# Patient Record
Sex: FEMALE | Race: WHITE | NOT HISPANIC OR LATINO | Employment: FULL TIME | URBAN - METROPOLITAN AREA
[De-identification: names, ages, dates, MRNs, and addresses within clinical notes are randomized per-mention and may not be internally consistent; named-entity substitution may affect disease eponyms.]

---

## 2024-03-01 DIAGNOSIS — Z91.89 AT HIGH RISK FOR BREAST CANCER: Primary | ICD-10-CM

## 2024-03-01 DIAGNOSIS — Z80.3 FAMILY HISTORY OF BREAST CANCER: ICD-10-CM

## 2024-03-21 NOTE — PROGRESS NOTES
[facility] - [facility]  Breast Surgical Oncology  New Patient Office Visit - H&P      Referring Provider: Dr. Rivas Padilla  PCP: No, Primary Doctor   Care Team:  OBGYN: No data on file.    Chief Complaint:   Chief Complaint   Patient presents with    Genetic Evaluation     Patient reports no breast related concerns       Subjective:     HPI:  Shelby Bello is a 43 y.o. female who presents on 3/25/2024 for evaluation of risk for breast cancer. Based on the Tyrer-Cuzick Breast Cancer Risk Model, her lifetime risk is calculated to be 27.5%.    Patient is doing well today. She currently denies any breast issues including rashes, redness, pain, swelling, nipple discharge, or new lumps/masses. She recently had a screening mammogram done in 2024 which required further imaging. She had a right diagnostic mammogram in 2024 which resulted as benign.  Patient states she recently underwent genetic testing with her gynecologist through [facility], but she has not gotten the results yet.  She has regular menstrual cycles.  She performs her self-breast exams regularly.  Patient has been using testosterone pellets for about 2 years now. Patient states she lives a relatively healthy lifestyle.  She states she does Pilates a couple times a week for exercise.  She does not smoke and she drinks alcohol on occasion.  She currently works for a construction company.    Of note, patient's mother was diagnosed with breast cancer around the age of 65.  Her father was diagnosed with skin cancer around the age of 65.  She has a paternal uncle who had prostate cancer at the age of 70.  She has a maternal aunt who was diagnosed with multiple myeloma at the age of 55.      Imagin2024 SC MG at [facility] - additional evaluation recommended for questions right breast asymmetry.  BI-RADS 0 needs additional imaging.  3/6/2024 R DG MG at [facility]- Previously described right lateral breast asymmetry  does not persist,   compatible with superimposition of fibroglandular tissue. No mammographic   evidence of malignancy. BI-RADS 2-benign findings     Pathology:   none    OB/GYN History:  Age at Menarche Onset: 12  Menopausal Status: premenopausal, LMP: Patient's last menstrual period was 03/03/2024.  Hysterectomy/Oophorectomy: NA  Hormonal birth control (duration): 22 years  Pregnancy History: Nulliparous   Age at first live birth: NA  Hormone Replacement Therapy: No, none    Other:  MG breast density:BIRADS C   Prior thoracic RT: none  Genetic testing:  Patient underwent genetic testing with her gynecologist through [facility].  She states she is still waiting on the results.    Ashkenazi Hoahaoism descent: No    Family History:  Family History   Problem Relation Age of Onset    Breast cancer Mother 60 - 69    Skin cancer Father 60 - 69    Alzheimer's disease Maternal Grandmother     Lung cancer Maternal Grandfather 60 - 69    Heart disease Paternal Grandmother     Heart disease Paternal Grandfather     Prostate cancer Paternal Uncle         unknown    Bone cancer Maternal Aunt 50 - 59        Patient History:  Past Medical History:   Diagnosis Date    ADD (attention deficit disorder)     Allergy     Anxiety     Thyroid disease        Past Surgical History:   Procedure Laterality Date    EYE SURGERY      TONSILLECTOMY         Social History     Socioeconomic History    Marital status:    Tobacco Use    Smoking status: Never    Smokeless tobacco: Never   Substance and Sexual Activity    Alcohol use: Never    Drug use: Never    Sexual activity: Never         There is no immunization history on file for this patient.    Medications/Allergies:    Current Outpatient Medications:     ARMOUR THYROID 60 mg Tab, Take 60 mg by mouth., Disp: , Rfl:     cetirizine (ZYRTEC) 10 MG tablet, Take 10 mg by mouth., Disp: , Rfl:     lisdexamfetamine (VYVANSE) 30 MG capsule, Take 30 mg by mouth., Disp: , Rfl:     MAGNESIUM  "GLYCINATE-MAG OXIDE ORAL, Take by mouth., Disp: , Rfl:     melatonin 1 mg Chew, Take by mouth., Disp: , Rfl:     multivitamin with minerals (HAIR,SKIN AND NAILS) tablet, Take 1 tablet by mouth once daily., Disp: , Rfl:     tretinoin (RETIN-A) 0.05 % cream, Apply topically., Disp: , Rfl:      Review of patient's allergies indicates:   Allergen Reactions    Codeine Itching       Review of Systems:  All pertinent history in HPI.     Objective:     Vitals:  Vitals:    03/25/24 1326   BP: 125/83   BP Location: Left arm   Patient Position: Sitting   BP Method: Medium (Automatic)   Pulse: 89   Resp: 18   Temp: 99.1 °F (37.3 °C)   TempSrc: Oral   SpO2: 98%   Weight: 54.9 kg (121 lb)   Height: 4' 11" (1.499 m)       Body mass index is 24.44 kg/m².     Physical Exam:  General: The patient is awake, alert and oriented times three. The patient is well nourished and in no acute distress.  Neck: There is no evidence of palpable cervical, supraclavicular or axillary adenopathy. The neck is supple. The thyroid is not enlarged.  Musculoskeletal: The patient has a normal range of motion of her bilateral upper extremities.  Chest: Examination of the chest wall fails to reveal any obvious abnormalities.  The lungs are clear to auscultation bilaterally without rales, rhonchi, or wheezing.  Cardiovascular: The heart has a regular rate and rhythm without murmurs, gallops or rubs.  Breast:   Right:  Examination of right breast fails to reveal any dominant masses or areas of significant focal nodularity. The nipple is everted without evidence of discharge. There is no skin dimpling with movement of the pectoralis. There is no significant skin changes overlying the breast.   Left:  Examination of the left breast fails to reveal any dominant masses or areas of significant focal nodularity. The nipple is everted without evidence of discharge. There is no skin dimpling with movement of the pectoralis. There are no significant skin changes " overlying the breast.  Abdomen: The abdomen is soft, flat, nontender and nondistended with no palpable masses or organomegaly.  Integumentary: no rashes or skin lesions present  Neurologic: cranial nerves intact, no signs of peripheral neurological deficit, motor/sensory function intact    Assessment:     There is no problem list on file for this patient.       Shelby was seen today for genetic evaluation.    Diagnoses and all orders for this visit:    At high risk for breast cancer  -     Ambulatory referral/consult to Breast Surgery  -     Mammo Digital Screening Bilat w/ Donny; Future  -     MRI Screening Breast W/WO Contrast, W/CAD, Alejandro; Future    Family history of breast cancer  -     Ambulatory referral/consult to Breast Surgery  -     Mammo Digital Screening Bilat w/ Donny; Future  -     MRI Screening Breast W/WO Contrast, W/CAD, Alejandro; Future    Screening mammogram for breast cancer  -     Mammo Digital Screening Bilat w/ Donny; Future       --------------------------------------------------------------------------------------------------------------  After the initial clinical evaluation nearly 30 minutes were on counseling the patients regarding the options for management. Risk reduction strategies were discussed.     1. Lifestyle factors: As with other types of cancer, studies continue to show that various lifestyle factors may contribute to the development of breast cancer.     Weight: Recent studies have shown that postmenopausal women who are overweight or obese have an increased risk of breast cancer. These women also have a higher risk of having the cancer come back after treatment.     Physical activity: Decreased physical activity is associated with an increased risk of developing breast cancer and a higher risk of having the cancer come back after treatment. Regular physical activity may protect against breast cancer by helping women maintain a healthy body weight, lowering hormone levels, or causing  "changes in a womens metabolism or immune factors.     Alcohol: Current research suggests that having more than 1 to 2 alcoholic drinks, including beer, wine, and spirits, per day raises the risk of breast cancer, as well as the risk of having the cancer come back after treatment.     Food: There is no reliable research that confirms that eating or avoiding specific foods reduces the risk of developing breast cancer or having the cancer come back after treatment. However, eating more fruits and vegetables and fewer animal fats is linked with many health benefits.     2. Prevention:  Surgery to lower cancer risk: For women with BRCA1 or BRCA2 genetic mutations, which substantially increase the risk of breast cancer, preventive removal of the breasts may be considered. The procedure, called a prophylactic mastectomy, appears to reduce the risk of developing breast cancer by at least 95%. Women with these mutations should also consider the preventive removal of the ovaries and fallopian tubes, called a prophylactic salpingo-oophorectomy. This procedure can reduce the risk of developing ovarian cancer, as well as breast cancer, by stopping the ovaries from making estrogen.      Drugs to lower cancer risk (Chemoprevention): Women who have a higher than usual risk of developing breast cancer may consider certain drugs that may help prevent breast cancer. This approach may also be called "chemoprevention." For breast cancer, this is the use of hormone-blocking drugs to reduce cancer risk. The drugs, tamoxifen (Soltamox) and raloxifene (Evista), are approved by the U.S. Food and Drug Administration (FDA) to lower breast cancer risk. These drugs are called selective estrogen receptor modulators (SERMs) and are not chemotherapy. A SERM is a medication that blocks estrogen receptors in some tissues and not others. Both women who have gone through menopause and those who have not may take tamoxifen. Raloxifene is only approved " for women who have gone through menopause. Each drug also has different side effects.     Aromatase inhibitors (AIs) have also been shown to lower breast cancer risk. AIs are a type of hormone-blocking treatment that reduces the amount of estrogen in a woman's body by stopping tissues and organs other than the ovaries from producing estrogen. They can only be used by women who have gone through menopause. However, no AIs have been approved by the FDA for lowering breast cancer risk in women who do not have the disease.     3. Surveillance: Women with a known genetic mutation should follow screening guidelines per the NCCN guidelines. Women with no known genetic mutation  and found to be at greater than 20 percent average lifetime risk of breast cancer are recommended for the following screening recommendations:     Clinical Breast exam: Every 6-12 months starting at age found to be at increased risk by risk model     Mammogram: Per NCCN guidelines, recommended every year starting 10 years younger than the youngest breast cancer case in the family (but not before age 30). May consider beginning breast MRIs at age 30 per ACR guidelines if desired by patient or other clinical considerations. May also consider getting a baseline MG at time of initial high risk consultation, if not already obtained.     Breast MRI: Per NCCN guidelines, recommended every year starting 10 years younger than the youngest breast cancer case in the family (but not before age 25). May consider beginning breast MRIs at age 30 per ACR guidelines if desired by patient or other clinical considerations. If patient has a first-degree relative with a BRCA1/2 gene mutation, youre encouraged to get genetic counseling and/or testing before getting MRI as part of screening (for those who do not wish to have genetic testing, MRI is recommended). Breast MRI in combination with mammography is better than mammography alone at finding breast cancer in  "certain women at higher than average risk.     --------------------------------------------------------------------------------------------------------------     Hormone Replacement Therapy (HRT) - Per breastcancer.org:    Combination HRT increases breast cancer risk by about 75%, even when used for only a short time. Combination HRT also increases the likelihood that the cancer may be found at a more advanced stage, as well as increasing the risk that a woman diagnosed with breast cancer will die from the disease.    Estrogen-only HRT increases the risk of breast cancer, but only when used for more than 10 years. Estrogen-only HRT also can increase the risk of ovarian cancer.    The higher breast cancer risk from using HRT is the same for so-called "bioidentical" and "natural" hormones as it is for synthetic hormones. "Bioidentical" means the hormones in the product are identical to the hormones your body produces. Bioidentical hormones are said to be "natural" -- derived from plants. Synthetic hormones are made in a lab and are also chemically identical to the hormones in your body. It's important to know that many herbal and bioidentical HRT products fall outside the jurisdiction of the [place] Food and Drug Administration and so aren't subject to the same regulations and testing that medications are.    In addition, HRT is known to increased mammographic density, which may obscure small masses.        Plan:       1. Lifestyle - Healthy lifestyle guidelines were reviewed. She was encouraged to engage in regular exercise, maintain a healthy body weight, and avoid excessive alcohol consumption. Healthy nutritional guidelines were also discussed. Self-breast examination was reviewed with the patient in detail and she was encouraged to perform this on a monthly basis.Discussed with patient the risks of being on hormone replacement therapy and provided her with an educational handout.      2. Surveillance - She " desires undergoing high risk screening with annual screening mammograms and breast MRIs. In the absence of significant clinical findings in the interval, I recommend breast MRI in August 2024 and screening mammogram in February 2025.  RTC in 6 months.    3. Prevention - We had a brief discussion/education about indications for preventative mastectomy or chemoprevention.  These methods are not recommended to her at this time.    4. Genetics - Patient has already undergone genetic testing and is currently waiting for the results.    5. Other routine screening exams:   -  Recommend annual follow up with PCP.   -  Recommend annual follow up with GYN for pap smears/gynecologic exams and CBE.       All of her questions were answered. She was advised to call if she develops any questions or concerns.    Martha Foster PA-C     --------------------------------------------------------------------------------------------------------------  Total time on the date of the visit ranged from 60-74 mins ([id number]). Total time includes both face-to-face and non-face-to-face time personally spent by myself on the day of the visit.    Non-face-to-face time included:  _X_ preparing to see the patient such as reviewing the patient record  _X_ obtaining and reviewing separately obtained history  _X_ independently interpreting results  _X_ documenting clinical information in electronic health record.    Face-to-face time included:  _X_ performing an appropriate history and examination  _X_ communicating results to the patient  _X_ counseling and educating the patient  __ ordering appropriate medications  _x_ ordering appropriate tests  _X_ ordering appropriate procedures (including follow-up)  _X_ answering any questions the patient had    Total Time spent on date of visit: 60 minutes

## 2024-03-25 ENCOUNTER — OFFICE VISIT (OUTPATIENT)
Dept: SURGERY | Facility: CLINIC | Age: 44
End: 2024-03-25
Payer: COMMERCIAL

## 2024-03-25 VITALS
SYSTOLIC BLOOD PRESSURE: 125 MMHG | HEIGHT: 59 IN | BODY MASS INDEX: 24.39 KG/M2 | WEIGHT: 121 LBS | DIASTOLIC BLOOD PRESSURE: 83 MMHG | OXYGEN SATURATION: 98 % | RESPIRATION RATE: 18 BRPM | HEART RATE: 89 BPM | TEMPERATURE: 99 F

## 2024-03-25 DIAGNOSIS — Z80.3 FAMILY HISTORY OF BREAST CANCER: ICD-10-CM

## 2024-03-25 DIAGNOSIS — Z12.31 SCREENING MAMMOGRAM FOR BREAST CANCER: ICD-10-CM

## 2024-03-25 DIAGNOSIS — Z91.89 AT HIGH RISK FOR BREAST CANCER: Primary | ICD-10-CM

## 2024-03-25 PROCEDURE — 3079F DIAST BP 80-89 MM HG: CPT | Mod: CPTII,S$GLB,,

## 2024-03-25 PROCEDURE — 1160F RVW MEDS BY RX/DR IN RCRD: CPT | Mod: CPTII,S$GLB,,

## 2024-03-25 PROCEDURE — 3074F SYST BP LT 130 MM HG: CPT | Mod: CPTII,S$GLB,,

## 2024-03-25 PROCEDURE — 99999 PR PBB SHADOW E&M-EST. PATIENT-LVL V: CPT | Mod: PBBFAC,,,

## 2024-03-25 PROCEDURE — 3008F BODY MASS INDEX DOCD: CPT | Mod: CPTII,S$GLB,,

## 2024-03-25 PROCEDURE — 99205 OFFICE O/P NEW HI 60 MIN: CPT | Mod: S$GLB,,,

## 2024-03-25 PROCEDURE — 1159F MED LIST DOCD IN RCRD: CPT | Mod: CPTII,S$GLB,,

## 2024-03-25 RX ORDER — LISDEXAMFETAMINE DIMESYLATE 30 MG/1
30 CAPSULE ORAL
COMMUNITY
Start: 2024-03-04

## 2024-03-25 RX ORDER — MELATONIN 1 MG
TABLET,CHEWABLE ORAL
COMMUNITY

## 2024-03-25 RX ORDER — TRETINOIN 0.5 MG/G
CREAM TOPICAL
COMMUNITY
Start: 2023-12-21

## 2024-03-25 RX ORDER — CETIRIZINE HYDROCHLORIDE 10 MG/1
10 TABLET ORAL
COMMUNITY

## 2024-03-25 RX ORDER — SULFAMETHOXAZOLE AND TRIMETHOPRIM 800; 160 MG/1; MG/1
60 TABLET ORAL
COMMUNITY
Start: 2024-03-24

## 2024-10-07 NOTE — PROGRESS NOTES
[facility] Breast Surg  Breast Surgical Oncology  Est Patient Office Visit - H&P    Referring Provider: No ref. provider found  PCP: No, Primary Doctor   Care Team:  OBGYN: No data on file.    Chief Complaint:   High-risk follow-up appointment     Subjective:     Interval: 10/09/2024 Patient here for six-month high-risk follow-up appointment. Patient is doing well today.  She has no breast concerns today.  She would not undergo any imaging in the interval.  Patient states she was never scheduled for her breast MRI, but she would like to be rescheduled today.  She states she did have a maternal uncle in his 60s who was diagnosed with stomach cancer in the interval. She denies any other significant interval changes.  Patient is still currently using testosterone pellets.     HPI:  Shelby Bello is a 44 y.o. female who presents on 3/25/2024 for evaluation of risk for breast cancer. Based on the Tyrer-Cuzick Breast Cancer Risk Model, her lifetime risk is calculated to be 27.5%.    Patient is doing well today. She currently denies any breast issues including rashes, redness, pain, swelling, nipple discharge, or new lumps/masses. She recently had a screening mammogram done in February of 2024 which required further imaging. She had a right diagnostic mammogram in March of 2024 which resulted as benign.  Patient states she recently underwent genetic testing with her gynecologist through [facility], but she has not gotten the results yet.  She has regular menstrual cycles.  She performs her self-breast exams regularly.  Patient has been using testosterone pellets for about 2 years now. Patient states she lives a relatively healthy lifestyle.  She states she does Pilates a couple times a week for exercise. She does not smoke and she drinks alcohol on occasion.  She currently works for a construction company.    Of note, patient's mother was diagnosed with breast cancer around the age of 65.  Her  father was diagnosed with skin cancer around the age of 65.  She has a paternal uncle who had prostate cancer at the age of 70. She has a maternal aunt who was diagnosed with multiple myeloma at the age of 55.      Imagin2024 SC MG at [facility] - additional evaluation recommended for questions right breast asymmetry.  BI-RADS 0 needs additional imaging.  3/6/2024 R DG MG at [facility]- Previously described right lateral breast asymmetry does not persist,   compatible with superimposition of fibroglandular tissue. No mammographic   evidence of malignancy. BI-RADS 2-benign findings     Pathology:   none    OB/GYN History:  Age at Menarche Onset: 12  Menopausal Status: premenopausal, LMP: Patient's last menstrual period was 10/01/2024 (exact date).  Hysterectomy/Oophorectomy: NA  Hormonal birth control (duration): 22 years  Pregnancy History: Nulliparous   Age at first live birth: Negative   Hormone Replacement Therapy: Yes. Testosterone pellets.     Other:  MG breast density:BIRADS C   Prior thoracic RT: none  Genetic testing:  Negative.  Ashkenazi Hindu descent: No    Family History:  Family History   Problem Relation Name Age of Onset    Breast cancer Mother Rena Vasquez 60 - 69    Hypertension Mother Rena Vasquez     Depression Mother Rena Vasquez     Skin cancer Father Taj Spearseajuan 60 - 69    Heart disease Father Taj Spearseajuan     Alzheimer's disease Maternal Grandmother      Lung cancer Maternal Grandfather Yusuf Adamesouard 60 - 69    Cancer Maternal Grandfather Yusuf Vuong     Heart disease Paternal Grandmother Marsha Haddadhreaux     Heart disease Paternal Grandfather Maritza Gothreaux     Prostate cancer Paternal Uncle          unknown    Bone cancer Maternal Aunt  50 - 59    Cancer Maternal Aunt Saige Hernándezrk     Cancer Maternal Uncle Tim Hastingsard     Alcohol abuse Maternal Uncle Tim Hastingsard     Drug abuse Maternal Uncle Tim Vuong     Cancer Maternal Uncle DOUGIE Vuong      Cancer Paternal Uncle Vimal Vasquez     Heart disease Paternal Uncle Vimal Vasquez     Hypertension Sister Dayanara Lentz     Thyroid disease Sister Dayanara Lentz     Alcohol abuse Brother Jeff Vasquez     Depression Brother Jeff Vasquez     Drug abuse Brother Jeff Spearseaux     Heart disease Paternal Aunt Oralia Haile         Patient History:  Past Medical History:   Diagnosis Date    ADD (attention deficit disorder)     Allergy     Anxiety     Thyroid disease        Past Surgical History:   Procedure Laterality Date    EYE SURGERY      TONSILLECTOMY         Social History     Socioeconomic History    Marital status:    Tobacco Use    Smoking status: Never    Smokeless tobacco: Never   Substance and Sexual Activity    Alcohol use: Yes     Alcohol/week: 2.0 standard drinks of alcohol     Types: 1 Glasses of wine, 1 Drinks containing 0.5 oz of alcohol per week     Comment: Not every week. 1 or the other    Drug use: Never    Sexual activity: Yes     Partners: Male     Birth control/protection: None         There is no immunization history on file for this patient.    Medications/Allergies:    Current Outpatient Medications:     ARMOUR THYROID 60 mg Tab, Take 60 mg by mouth., Disp: , Rfl:     cetirizine (ZYRTEC) 10 MG tablet, Take 10 mg by mouth., Disp: , Rfl:     clonazePAM (KLONOPIN) 0.5 MG tablet, Take 0.5 mg by mouth., Disp: , Rfl:     lisdexamfetamine (VYVANSE) 30 MG capsule, Take 30 mg by mouth., Disp: , Rfl:     MAGNESIUM GLYCINATE-MAG OXIDE ORAL, Take by mouth., Disp: , Rfl:     melatonin 5 mg Chew, Take by mouth., Disp: , Rfl:     multivitamin with minerals (HAIR,SKIN AND NAILS) tablet, Take 1 tablet by mouth once daily., Disp: , Rfl:     tretinoin (RETIN-A) 0.05 % cream, Apply topically., Disp: , Rfl:      Review of patient's allergies indicates:   Allergen Reactions    Codeine Itching       Review of Systems:  All pertinent history in HPI.     Objective:     Vitals:  Vitals:    10/09/24  "1112   BP: 137/80   BP Location: Right arm   Patient Position: Sitting   Pulse: 79   Resp: 18   Temp: 98.2 °F (36.8 °C)   TempSrc: Oral   SpO2: 99%   Weight: 55.7 kg (122 lb 12.8 oz)   Height: 4' 11" (1.499 m)         Body mass index is 24.8 kg/m².     Physical Exam:  General: The patient is awake, alert and oriented times three. The patient is well nourished and in no acute distress.  Neck: There is no evidence of palpable cervical, supraclavicular or axillary adenopathy. The neck is supple. The thyroid is not enlarged.  Musculoskeletal: The patient has a normal range of motion of her bilateral upper extremities.  Chest: Examination of the chest wall fails to reveal any obvious abnormalities.  The lungs are clear to auscultation bilaterally without rales, rhonchi, or wheezing.  Cardiovascular: The heart has a regular rate and rhythm without murmurs, gallops or rubs.  Breast:   Right:   Examination of right breast fails to reveal any dominant masses or areas of significant focal nodularity. The nipple is everted without evidence of discharge. There is no skin dimpling with movement of the pectoralis. There is no significant skin changes overlying the breast.   Left:   Examination of the left breast fails to reveal any dominant masses or areas of significant focal nodularity. The nipple is everted without evidence of discharge. There is no skin dimpling with movement of the pectoralis. There are no significant skin changes overlying the breast.  Abdomen: The abdomen is soft, flat, nontender and nondistended with no palpable masses or organomegaly.  Integumentary: no rashes or skin lesions present  Neurologic: cranial nerves intact, no signs of peripheral neurological deficit, motor/sensory function intact    Assessment:     There is no problem list on file for this patient.       Diagnoses and all orders for this visit:    At high risk for breast cancer  -     MRI Screening Breast W/WO Contrast, W/CAD, Alejandro; " Future    Family history of breast cancer  -     MRI Screening Breast W/WO Contrast, W/CAD, Alejandro; Future    Screening mammogram for breast cancer         --------------------------------------------------------------------------------------------------------------  After the initial clinical evaluation nearly 30 minutes were on counseling the patients regarding the options for management. Risk reduction strategies were discussed.     1. Lifestyle factors: As with other types of cancer, studies continue to show that various lifestyle factors may contribute to the development of breast cancer.     Weight: Recent studies have shown that postmenopausal women who are overweight or obese have an increased risk of breast cancer. These women also have a higher risk of having the cancer come back after treatment.     Physical activity: Decreased physical activity is associated with an increased risk of developing breast cancer and a higher risk of having the cancer come back after treatment. Regular physical activity may protect against breast cancer by helping women maintain a healthy body weight, lowering hormone levels, or causing changes in a womens metabolism or immune factors.     Alcohol: Current research suggests that having more than 1 to 2 alcoholic drinks, including beer, wine, and spirits, per day raises the risk of breast cancer, as well as the risk of having the cancer come back after treatment.     Food: There is no reliable research that confirms that eating or avoiding specific foods reduces the risk of developing breast cancer or having the cancer come back after treatment. However, eating more fruits and vegetables and fewer animal fats is linked with many health benefits.     2. Prevention:  Surgery to lower cancer risk: For women with BRCA1 or BRCA2 genetic mutations, which substantially increase the risk of breast cancer, preventive removal of the breasts may be considered. The procedure, called a  "prophylactic mastectomy, appears to reduce the risk of developing breast cancer by at least 95%. Women with these mutations should also consider the preventive removal of the ovaries and fallopian tubes, called a prophylactic salpingo-oophorectomy. This procedure can reduce the risk of developing ovarian cancer, as well as breast cancer, by stopping the ovaries from making estrogen.      Drugs to lower cancer risk (Chemoprevention): Women who have a higher than usual risk of developing breast cancer may consider certain drugs that may help prevent breast cancer. This approach may also be called "chemoprevention." For breast cancer, this is the use of hormone-blocking drugs to reduce cancer risk. The drugs, tamoxifen (Soltamox) and raloxifene (Evista), are approved by the U.S. Food and Drug Administration (FDA) to lower breast cancer risk. These drugs are called selective estrogen receptor modulators (SERMs) and are not chemotherapy. A SERM is a medication that blocks estrogen receptors in some tissues and not others. Both women who have gone through menopause and those who have not may take tamoxifen. Raloxifene is only approved for women who have gone through menopause. Each drug also has different side effects.     Aromatase inhibitors (AIs) have also been shown to lower breast cancer risk. AIs are a type of hormone-blocking treatment that reduces the amount of estrogen in a woman's body by stopping tissues and organs other than the ovaries from producing estrogen. They can only be used by women who have gone through menopause. However, no AIs have been approved by the FDA for lowering breast cancer risk in women who do not have the disease.     3. Surveillance: Women with a known genetic mutation should follow screening guidelines per the NCCN guidelines. Women with no known genetic mutation  and found to be at greater than 20 percent average lifetime risk of breast cancer are recommended for the following " "screening recommendations:     Clinical Breast exam: Every 6-12 months starting at age found to be at increased risk by risk model     Mammogram: Per NCCN guidelines, recommended every year starting 10 years younger than the youngest breast cancer case in the family (but not before age 30). May consider beginning breast MRIs at age 30 per ACR guidelines if desired by patient or other clinical considerations. May also consider getting a baseline MG at time of initial high risk consultation, if not already obtained.     Breast MRI: Per NCCN guidelines, recommended every year starting 10 years younger than the youngest breast cancer case in the family (but not before age 25). May consider beginning breast MRIs at age 30 per ACR guidelines if desired by patient or other clinical considerations. If patient has a first-degree relative with a BRCA1/2 gene mutation, youre encouraged to get genetic counseling and/or testing before getting MRI as part of screening (for those who do not wish to have genetic testing, MRI is recommended). Breast MRI in combination with mammography is better than mammography alone at finding breast cancer in certain women at higher than average risk.     --------------------------------------------------------------------------------------------------------------     Hormone Replacement Therapy (HRT) - Per breastcancer.org:    Combination HRT increases breast cancer risk by about 75%, even when used for only a short time. Combination HRT also increases the likelihood that the cancer may be found at a more advanced stage, as well as increasing the risk that a woman diagnosed with breast cancer will die from the disease.    Estrogen-only HRT increases the risk of breast cancer, but only when used for more than 10 years. Estrogen-only HRT also can increase the risk of ovarian cancer.    The higher breast cancer risk from using HRT is the same for so-called "bioidentical" and "natural" hormones as " "it is for synthetic hormones. "Bioidentical" means the hormones in the product are identical to the hormones your body produces. Bioidentical hormones are said to be "natural" -- derived from plants. Synthetic hormones are made in a lab and are also chemically identical to the hormones in your body. It's important to know that many herbal and bioidentical HRT products fall outside the jurisdiction of the [place] Food and Drug Administration and so aren't subject to the same regulations and testing that medications are.    In addition, HRT is known to increased mammographic density, which may obscure small masses.        Plan:       1. Lifestyle - Healthy lifestyle guidelines were reviewed. She was encouraged to engage in regular exercise, maintain a healthy body weight, and avoid excessive alcohol consumption. Healthy nutritional guidelines were also discussed. Self-breast examination was reviewed with the patient in detail and she was encouraged to perform this on a monthly basis. Discussed with patient the risks of being on hormone replacement therapy and provided her with an educational handout.      2. Surveillance - She desires undergoing high risk screening with annual screening mammograms and breast MRIs. In the absence of significant clinical findings in the interval, I recommend breast MRI next available and screening mammogram in February 2025. Patient does imaging at [facility] in 1 year.    3. Prevention - We had a brief discussion/education about indications for preventative mastectomy or chemoprevention.  These methods are not recommended to her at this time.    4. Genetics - Patient has already undergone genetic testing and results negative.     5. Other routine screening exams:   -  Recommend annual follow up with PCP.   -  Recommend annual follow up with GYN for pap smears/gynecologic exams and CBE.       All of her questions were answered. She was advised to call if she develops any questions " or concerns.    Martha Jarrett PA-C       Total time on the date of the visit ranged from 30-39 mins ([id number]). Total time includes both face-to-face and non-face-to-face time personally spent by myself on the day of the visit.    Non-face-to-face time included:  _X_ preparing to see the patient such as reviewing the patient record  __ obtaining and reviewing separately obtained history  _X_ independently interpreting results  _X_ documenting clinical information in electronic health record.    Face-to-face time included:  _X_ performing an appropriate history and examination  _X_ communicating results to the patient  _X_ counseling and educating the patient  __ ordering appropriate medications  __ ordering appropriate tests  _X_ ordering appropriate procedures (including follow-up)  _X_ answering any questions the patient had    Total Time spent on date of visit: 35 minutes

## 2024-10-09 ENCOUNTER — OFFICE VISIT (OUTPATIENT)
Dept: SURGERY | Facility: CLINIC | Age: 44
End: 2024-10-09
Payer: COMMERCIAL

## 2024-10-09 VITALS
OXYGEN SATURATION: 99 % | HEART RATE: 79 BPM | RESPIRATION RATE: 18 BRPM | WEIGHT: 122.81 LBS | BODY MASS INDEX: 24.76 KG/M2 | DIASTOLIC BLOOD PRESSURE: 80 MMHG | TEMPERATURE: 98 F | HEIGHT: 59 IN | SYSTOLIC BLOOD PRESSURE: 137 MMHG

## 2024-10-09 DIAGNOSIS — Z12.31 SCREENING MAMMOGRAM FOR BREAST CANCER: ICD-10-CM

## 2024-10-09 DIAGNOSIS — Z80.3 FAMILY HISTORY OF BREAST CANCER: ICD-10-CM

## 2024-10-09 DIAGNOSIS — Z91.89 AT HIGH RISK FOR BREAST CANCER: Primary | ICD-10-CM

## 2024-10-09 PROCEDURE — 99214 OFFICE O/P EST MOD 30 MIN: CPT | Mod: S$GLB,,,

## 2024-10-09 PROCEDURE — 99999 PR PBB SHADOW E&M-EST. PATIENT-LVL IV: CPT | Mod: PBBFAC,,,

## 2024-10-09 PROCEDURE — 1160F RVW MEDS BY RX/DR IN RCRD: CPT | Mod: CPTII,S$GLB,,

## 2024-10-09 PROCEDURE — 3008F BODY MASS INDEX DOCD: CPT | Mod: CPTII,S$GLB,,

## 2024-10-09 PROCEDURE — 3079F DIAST BP 80-89 MM HG: CPT | Mod: CPTII,S$GLB,,

## 2024-10-09 PROCEDURE — 3075F SYST BP GE 130 - 139MM HG: CPT | Mod: CPTII,S$GLB,,

## 2024-10-09 PROCEDURE — 1159F MED LIST DOCD IN RCRD: CPT | Mod: CPTII,S$GLB,,

## 2024-10-09 RX ORDER — CLONAZEPAM 0.5 MG/1
0.5 TABLET ORAL
COMMUNITY
Start: 2024-10-07

## 2025-03-11 ENCOUNTER — RESULTS FOLLOW-UP (OUTPATIENT)
Dept: SURGERY | Facility: CLINIC | Age: 45
End: 2025-03-11

## 2025-03-11 ENCOUNTER — HOSPITAL ENCOUNTER (OUTPATIENT)
Dept: RADIOLOGY | Facility: HOSPITAL | Age: 45
Discharge: HOME | End: 2025-03-11
Payer: COMMERCIAL

## 2025-03-11 DIAGNOSIS — Z80.3 FAMILY HISTORY OF BREAST CANCER: ICD-10-CM

## 2025-03-11 DIAGNOSIS — Z91.89 AT HIGH RISK FOR BREAST CANCER: ICD-10-CM

## 2025-03-11 DIAGNOSIS — Z12.31 SCREENING MAMMOGRAM FOR BREAST CANCER: ICD-10-CM

## 2025-03-11 PROCEDURE — 77067 SCR MAMMO BI INCL CAD: CPT | Mod: 26,,, | Performed by: RADIOLOGY

## 2025-03-11 PROCEDURE — 77067 SCR MAMMO BI INCL CAD: CPT | Mod: TC

## 2025-03-11 PROCEDURE — 77063 BREAST TOMOSYNTHESIS BI: CPT | Mod: 26,,, | Performed by: RADIOLOGY

## 2025-05-06 DIAGNOSIS — Z91.89 AT HIGH RISK FOR BREAST CANCER: Primary | ICD-10-CM
